# Patient Record
Sex: MALE | Race: WHITE
[De-identification: names, ages, dates, MRNs, and addresses within clinical notes are randomized per-mention and may not be internally consistent; named-entity substitution may affect disease eponyms.]

---

## 2017-10-24 NOTE — US
EXAMINATION: Right upper quadrant ultrasound

 

HISTORY: Pain

 

COMPARISON: None

 

TECHNIQUE: Grayscale and color Doppler images obtained of the right upper quadrant.

 

FINDINGS: Pancreas is not optimally characterize. The liver is moderately increased in generalized ec
hotexture without a focal hepatic mass. Gallstones are noted within the gallbladder. There is no gall
bladder wall thickening. Possible trace pericholecystic fluid. Common bile duct measures 6 mm. The ri
ght kidney measures at least 11.4 cm [id number] without evidence of hydronephrosis. Patient notes p
ain across the upper abdomen focally.

 

IMPRESSION: 

1. Cholelithiasis with a trace pericholecystic fluid. Correlate clinically for cholecystitis.

2. Moderate fatty infiltration of the liver.

## 2017-10-24 NOTE — EDM.PDOC
ED HPI GENERAL MEDICAL PROBLEM





- General


Chief Complaint: Abdominal Pain


Stated Complaint: UPPER STOMACH PAIN


Time Seen by Provider: 10/24/17 08:00


Source of Information: Reports: Patient


History Limitations: Reports: No Limitations





- History of Present Illness


INITIAL COMMENTS - FREE TEXT/NARRATIVE: 


History of present illness:


[]Patient complains of intermittent chronic abdominal pain he feels is 

gallstones. His not had any imaging and what has not been diagnosed with 

gallstones however he has a strong family history of it and "all the symptoms". 

At 3 AM this morning he awoke with severe upper abdominal pain he states after 

eating greasy food last night.





Review of systems: 


As per history of present illness and below otherwise all systems reviewed and 

negative.





Past medical history: 


As per history of present illness and as reviewed below otherwise 

noncontributory.





Surgical history: 


As per history of present illness and as reviewed below otherwise 

noncontributory.





Social history: 


No reported history of drug or alcohol abuse.





Family history: 


As per history of present illness and as reviewed below otherwise 

noncontributory.





Physical exam:


General: Well developed, well nourished in NAD


HEENT: Atraumatic, normocephalic, pupils reactive, negative for conjunctival 

pallor or scleral icterus, mucous membranes moist, throat clear, neck supple, 

nontender, trachea midline.


Lungs: Clear to auscultation, breath sounds equal bilaterally, chest nontender.


Heart: S1S2, regular, negative for clicks, rubs, or JVD.


Abdomen: Soft, nondistended, diffuse abdominal tenderness worse in the right 

and epigastric area without rebound or guarding. Negative for masses or 

hepatosplenomegaly. Negative for costovertebral tenderness.


Pelvis: Stable nontender.


Genitourinary: Deferred.


Rectal: Deferred.


Extremities: Atraumatic, negative for cords or calf pain. Neurovascular 

unremarkable.


Neuro: Awake, alert, oriented. Cranial nerves II through XII unremarkable. 

Cerebellum unremarkable. Motor and sensory unremarkable throughout. Exam 

nonfocal.





Diagnostics:


[]Labs are normal, gallbladder ultrasound shows stones without signs of wall 

thickening





Therapeutics:


[]IV hydrated and Dilaudid for pain, pains resolved





Impression: 


[]Cholelithiasis





Plan:


[]Follow-up Gen. surgery for elective cholecystectomy if needed





Definitive disposition and diagnosis as appropriate pending reevaluation and 

review of above.





  ** epigastric


Pain Score (Numeric/FACES): 10





- Related Data


 Allergies











Allergy/AdvReac Type Severity Reaction Status Date / Time


 


No Known Allergies Allergy   Verified 09/19/16 18:33











Home Meds: 


 Home Meds





Dicyclomine [Bentyl] 20 mg PO TID PRN #20 tablet 10/24/17 [Rx]











Past Medical History





- Past Health History


Medical/Surgical History: Denies Medical/Surgical History


HEENT History: Reports: Impaired Vision


Other HEENT History: wears glasses





Social & Family History





- Family History


Family Medical History: Noncontributory





- Tobacco Use


Smoking Status *Q: Never Smoker


Second Hand Smoke Exposure: No





- Recreational Drug Use


Recreational Drug Use: No





ED ROS GENERAL





- Review of Systems


Review Of Systems: See Below (See history of present illness)





ED EXAM, GI/ABD





- Physical Exam


Exam: See Below (See history of present illness)





Course





- Vital Signs


Last Recorded V/S: 


 Last Vital Signs











Temp  35.5 C   10/24/17 06:34


 


Pulse  68   10/24/17 08:42


 


Resp  18   10/24/17 08:42


 


BP  117/65   10/24/17 08:42


 


Pulse Ox  94 L  10/24/17 08:42














- Orders/Labs/Meds


Orders: 


 Active Orders 24 hr











 Category Date Time Status


 


 HYDROmorphone [Dilaudid] Med  10/24/17 07:00 Active





 0.5 mg IVPUSH Q1H PRN   


 


 Sodium Chloride 0.9% [Saline Flush] Med  10/24/17 07:00 Active





 10 ml FLUSH ASDIRECTED PRN   


 


 Sodium Chloride 0.9% [Saline Flush] Med  10/24/17 07:00 Active





 2.5 ml FLUSH ASDIRECTED PRN   


 


 Saline Lock Insert [OM.PC] Stat Oth  10/24/17 06:59 Ordered








 Medication Orders





Hydromorphone HCl (Dilaudid)  0.5 mg IVPUSH Q1H PRN


   PRN Reason: Pain


   Last Admin: 10/24/17 08:19  Dose: 0.5 mg


   Admin: 10/24/17 07:17  Dose: 0.5 mg


Sodium Chloride (Saline Flush)  10 ml FLUSH ASDIRECTED PRN


   PRN Reason: Keep Vein Open


   Last Admin: 10/24/17 07:16  Dose: 10 ml


Sodium Chloride (Saline Flush)  2.5 ml FLUSH ASDIRECTED PRN


   PRN Reason: Keep Vein Open


   Last Admin: 10/24/17 07:16  Dose: 2.5 ml








Labs: 


 Laboratory Tests











  10/24/17 10/24/17 10/24/17 Range/Units





  07:07 07:07 07:48 


 


WBC  6.04    (4.0-11.0)  K/uL


 


RBC  5.60    (4.50-5.90)  M/uL


 


Hgb  17.7 H    (13.0-17.0)  g/dL


 


Hct  49.6    (38.0-50.0)  %


 


MCV  88.6    (80.0-98.0)  fL


 


MCH  31.6    (27.0-32.0)  pg


 


MCHC  35.7    (31.0-37.0)  g/dL


 


RDW Std Deviation  40.5    (28.0-62.0)  fl


 


RDW Coeff of Chloé  13    (11.0-15.0)  %


 


Plt Count  146 L    (150-400)  K/uL


 


MPV  10.60    (7.40-12.00)  fL


 


Neut % (Auto)  67.3    (48.0-80.0)  %


 


Lymph % (Auto)  22.5    (16.0-40.0)  %


 


Mono % (Auto)  8.4    (0.0-15.0)  %


 


Eos % (Auto)  1.5    (0.0-7.0)  %


 


Baso % (Auto)  0.3    (0.0-1.5)  %


 


Neut # (Auto)  4.1    (1.4-5.7)  K/uL


 


Lymph # (Auto)  1.4    (0.6-2.4)  K/uL


 


Mono # (Auto)  0.5    (0.0-0.8)  K/uL


 


Eos # (Auto)  0.1    (0.0-0.7)  K/uL


 


Baso # (Auto)  0.0    (0.0-0.1)  K/uL


 


Nucleated RBC %  0.0    /100WBC


 


Nucleated RBCs #  0    K/uL


 


Sodium   139   (136-146)  mmol/L


 


Potassium   4.1   (3.5-5.1)  mmol/L


 


Chloride   104   ()  mmol/L


 


Carbon Dioxide   28   (21-31)  mmol/L


 


BUN   14   (6.0-23.0)  mg/dL


 


Creatinine   1.0   (0.6-1.5)  mg/dL


 


Est Cr Clr Drug Dosing   117.48   mL/min


 


Estimated GFR (MDRD)   > 60.0   ml/min


 


Glucose   133 H   ()  mg/dL


 


Calcium   9.6   (8.8-10.8)  mg/dL


 


Total Bilirubin   0.4   (0.1-1.5)  mg/dL


 


AST   20   (5-40)  IU/L


 


ALT   45   (8-54)  IU/L


 


Alkaline Phosphatase   62   ()  


 


Total Protein   7.5   (6.0-8.0)  g/dL


 


Albumin   4.3   (3.5-5.0)  g/dL


 


Globulin   3.2   (2.0-3.5)  g/dL


 


Albumin/Globulin Ratio   1.3   (1.3-2.8)  


 


Lipase   25   (7-80)  U/L


 


Urine Color    YELLOW  


 


Urine Appearance    CLEAR  


 


Urine pH    6.5  (5.0-8.0)  


 


Ur Specific Gravity    1.020  (1.001-1.035)  


 


Urine Protein    NEGATIVE  (NEGATIVE)  mg/dL


 


Urine Glucose (UA)    NEGATIVE  (NEGATIVE)  mg/dL


 


Urine Ketones    NEGATIVE  (NEGATIVE)  mg/dL


 


Urine Occult Blood    NEGATIVE  (NEGATIVE)  


 


Urine Nitrite    NEGATIVE  (NEGATIVE)  


 


Urine Bilirubin    NEGATIVE  (NEGATIVE)  


 


Urine Urobilinogen    0.2  (<2.0)  EU/dL


 


Ur Leukocyte Esterase    NEGATIVE  (NEGATIVE)  


 


Urine RBC    0-1  (0-2/HPF)  


 


Urine WBC    0-1  (0-5/HPF)  


 


Ur Epithelial Cells    RARE  (NONE-FEW)  


 


Urine Bacteria    RARE  (NEGATIVE)  


 


Urine Mucus    LIGHT  (NONE-MOD)  











Meds: 


Medications











Generic Name Dose Route Start Last Admin





  Trade Name Freq  PRN Reason Stop Dose Admin


 


Hydromorphone HCl  0.5 mg  10/24/17 07:00  10/24/17 08:19





  Dilaudid  IVPUSH   0.5 mg





  Q1H PRN   Administration





  Pain   


 


Sodium Chloride  10 ml  10/24/17 07:00  10/24/17 07:16





  Saline Flush  FLUSH   10 ml





  ASDIRECTED PRN   Administration





  Keep Vein Open   


 


Sodium Chloride  2.5 ml  10/24/17 07:00  10/24/17 07:16





  Saline Flush  FLUSH   2.5 ml





  ASDIRECTED PRN   Administration





  Keep Vein Open   














Discontinued Medications














Generic Name Dose Route Start Last Admin





  Trade Name Freq  PRN Reason Stop Dose Admin


 


Sodium Chloride  1,000 mls @ 999 mls/hr  10/24/17 07:00  10/24/17 07:16





  Normal Saline  IV  10/24/17 08:00  999 mls/hr





  .Bolus ONE   Administration


 


Ondansetron HCl  4 mg  10/24/17 07:00  10/24/17 07:16





  Zofran  IVPUSH  10/24/17 07:01  4 mg





  ONETIME ONE   Administration














Departure





- Departure


Time of Disposition: 09:07


Disposition: Home, Self-Care 01


Condition: Good


Clinical Impression: 


Cholelithiasis


Qualifiers:


 Cholelithiasis location: gallbladder Cholecystitis presence: without 

cholecystitis Biliary obstruction: without biliary obstruction Qualified Code(s)

: K80.20 - Calculus of gallbladder without cholecystitis without obstruction








- Discharge Information


Prescriptions: 


Dicyclomine [Bentyl] 20 mg PO TID PRN #20 tablet


 PRN Reason: Pain


Referrals: 


PCP,None [Primary Care Provider] - 


Forms:  ED Department Discharge


Additional Instructions: 


The following information is given to patients seen in the emergency department 

who are being discharged to home. This information is to outline your options 

for follow-up care. We provide all patients seen in our emergency department 

with a follow-up referral.





The need for follow-up, as well as the timing and circumstances, are variable 

depending upon the specifics of your emergency department visit.





If you don't have a primary care physician on staff, we will provide you with a 

referral. We always advise you to contact your personal physician following an 

emergency department visit to inform them of the circumstance of the visit and 

for follow-up with them and/or the need for any referrals to a consulting 

specialist.





The emergency department will also refer you to a specialist when appropriate. 

This referral assures that you have the opportunity for follow-up care with a 

specialist. All of these measure are taken in an effort to provide you with 

optimal care, which includes your follow-up.





Under all circumstances we always encourage you to contact your private 

physician who remains a resource for coordinating your care. When calling for 

follow-up care, please make the office aware that this follow-up is from your 

recent emergency room visit. If for any reason you are refused follow-up, 

please contact the  Emergency 

Department at [phone] and asked to speak to the emergency department 

charge nurse.





Bentyl for abdominal pains follow-up with general surgery, return if symptoms 

worsen, fevers, vomiting or change in symptoms.








Specialty Care - General Surgery


[facility]


[address] [place] [address], [address]


[place] [address]


Phone: [phone]


Fax: [phone]





- My Orders


Last 24 Hours: 


My Active Orders





10/24/17 06:59


Saline Lock Insert [OM.PC] Stat 





10/24/17 07:00


HYDROmorphone [Dilaudid]   0.5 mg IVPUSH Q1H PRN 


Sodium Chloride 0.9% [Saline Flush]   10 ml FLUSH ASDIRECTED PRN 


Sodium Chloride 0.9% [Saline Flush]   2.5 ml FLUSH ASDIRECTED PRN 














- Assessment/Plan


Last 24 Hours: 


My Active Orders





10/24/17 06:59


Saline Lock Insert [OM.PC] Stat 





10/24/17 07:00


HYDROmorphone [Dilaudid]   0.5 mg IVPUSH Q1H PRN 


Sodium Chloride 0.9% [Saline Flush]   10 ml FLUSH ASDIRECTED PRN 


Sodium Chloride 0.9% [Saline Flush]   2.5 ml FLUSH ASDIRECTED PRN

## 2020-06-28 ENCOUNTER — HOSPITAL ENCOUNTER (EMERGENCY)
Dept: HOSPITAL 56 - MW.ED | Age: 35
Discharge: HOME | End: 2020-06-28
Payer: COMMERCIAL

## 2020-06-28 VITALS — DIASTOLIC BLOOD PRESSURE: 67 MMHG | HEART RATE: 88 BPM | SYSTOLIC BLOOD PRESSURE: 127 MMHG

## 2020-06-28 DIAGNOSIS — S29.019A: Primary | ICD-10-CM
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
## 2020-06-28 NOTE — EDM.PDOC
ED HPI GENERAL MEDICAL PROBLEM





- General


Chief Complaint: Upper Extremity Injury/Pain


Stated Complaint: RT SHOULDER PAIN


Time Seen by Provider: 06/28/20 11:56





- History of Present Illness


INITIAL COMMENTS - FREE TEXT/NARRATIVE: 





History of present illness:


[] Patient presents with back pain in his mid thoracic spine on the right no 

injuries no fever no precipitating events.  Healthy otherwise no medical 

problems no allergies he denies any heavy lifting he says that movement and 

walking makes it worse being still makes it better he took some Tylenol with 

some relief last night he would like a work note for limited duty.





Review of systems: 


As per history of present illness and below otherwise all systems reviewed and 

negative.





Past medical history: 


As per history of present illness and as reviewed below otherwise nonc

ontributory.





Surgical history: 


As per history of present illness and as reviewed below otherwise 

noncontributory.





Social history: 


No reported history of drug or alcohol abuse.





Family history: 


As per history of present illness and as reviewed below otherwise 

noncontributory.





Physical exam:


HEENT: Atraumatic, normocephalic, pupils reactive, negative for conjunctival 

pallor or scleral icterus, mucous membranes moist, throat clear, neck supple, 

nontender, trachea midline.


Lungs: Clear to auscultation, breath sounds equal bilaterally, chest nontender.


Heart: S1S2, regular, negative for clicks, rubs, or JVD.


Abdomen: Soft, nondistended, nontender. Negative for masses or 

hepatosplenomegaly. Negative for costovertebral tenderness.


Pelvis: Stable nontender.


Genitourinary: Deferred.


Rectal: Deferred.


Extremities: Atraumatic, negative for cords or calf pain. Neurovascular 

unremarkable.


Neuro: Awake, alert, oriented. Cranial nerves II through XII unremarkable. 

Cerebellum unremarkable. Motor and sensory unremarkable throughout. Exam 

nonfocal.  There is no saddle anesthesia great toe strength is 5 out of 5.  

Strength 5 out of 5


Back: Tenderness and spasm at the level of approximately T4-T5 on the right side

 there is no midline tenderness


Diagnostics:


[]





Therapeutics:


[]





Impression: 


[]





Plan: Naproxen Flexeril work note


[]





Definitive disposition and diagnosis as appropriate pending reevaluation and 

review of above.





  ** R shoulder


Pain Score (Numeric/FACES): 3





- Related Data


                                    Allergies











Allergy/AdvReac Type Severity Reaction Status Date / Time


 


No Known Allergies Allergy   Verified 06/28/20 11:55











Home Meds: 


                                    Home Meds





Cyclobenzaprine [Flexeril] 10 mg PO TID #30 tab 06/28/20 [Rx]


Naproxen [Naprosyn] 500 mg PO Q12HR #20 tab 06/28/20 [Rx]











Past Medical History





- Past Health History


Medical/Surgical History: Denies Medical/Surgical History


HEENT History: Reports: Impaired Vision


Other HEENT History: wears glasses





Social & Family History





- Family History


Family Medical History: Noncontributory





Review of Systems





- Review of Systems


Review Of Systems: See Below





ED EXAM, GENERAL





- Physical Exam


Exam: See Below





Course





- Vital Signs


Last Recorded V/S: 





                                Last Vital Signs











Temp  35.9 C L  06/28/20 11:53


 


Pulse  76   06/28/20 11:53


 


Resp  17   06/28/20 11:53


 


BP  139/76   06/28/20 11:53


 


Pulse Ox  96   06/28/20 11:53














Departure





- Departure


Time of Disposition: 12:02


Disposition: Home, Self-Care 01


Condition: Good


Clinical Impression: 


Thoracic myofascial strain


Qualifiers:


 Encounter type: initial encounter Qualified Code(s): S29.019A - Strain of 

muscle and tendon of unspecified wall of thorax, initial encounter








- Discharge Information


*PRESCRIPTION DRUG MONITORING PROGRAM REVIEWED*: Not Applicable


*COPY OF PRESCRIPTION DRUG MONITORING REPORT IN PATIENT [facility]: Not Applicable


Instructions:  Muscle Strain, Easy-to-Read


Referrals: 


Alfredo Gallagher MD [Primary Care Provider] - 


Additional Instructions: 


The following information is given to patients seen in the emergency department 

who are being discharged to home. This information is to outline your options 

for follow-up care. We provide all patients seen in our emergency department 

with a follow-up referral.





The need for follow-up, as well as the timing and circumstances, are variable 

depending upon the specifics of your emergency department visit.





If you don't have a primary care physician on staff, we will provide you with a 

referral. We always advise you to contact your personal physician following an 

emergency department visit to inform them of the circumstance of the visit and 

for follow-up with them and/or the need for any referrals to a consulting 

specialist.





The emergency department will also refer you to a specialist when appropriate. 

This referral assures that you have the opportunity for follow-up care with a 

specialist. All of these measure are taken in an effort to provide you with 

optimal care, which includes your follow-up.





Under all circumstances we always encourage you to contact your private 

physician who remains a resource for coordinating your care. When calling for 

follow-up care, please make the office aware that this follow-up is from your 

recent emergency room visit. If for any reason you are refused follow-up, please

contact the [facility] Emergency Department

at [phone] and asked to speak to the emergency department charge nurse.





[facility] - Primary Care


[address] [place] [id number]


Phone: [phone]


Fax: [phone]








[address] [address]


Phone: [phone]


Fax: [phone]








Sepsis Event Note (ED)





- Evaluation


Sepsis Screening Result: No Definite Risk





- Focused Exam


Vital Signs: 





                                   Vital Signs











  Temp Pulse Resp BP Pulse Ox


 


 06/28/20 11:53  35.9 C L  76  17  139/76  96